# Patient Record
Sex: FEMALE | Race: OTHER | HISPANIC OR LATINO
[De-identification: names, ages, dates, MRNs, and addresses within clinical notes are randomized per-mention and may not be internally consistent; named-entity substitution may affect disease eponyms.]

---

## 2019-02-19 ENCOUNTER — APPOINTMENT (OUTPATIENT)
Dept: ENDOCRINOLOGY | Facility: CLINIC | Age: 59
End: 2019-02-19
Payer: COMMERCIAL

## 2019-02-19 ENCOUNTER — TRANSCRIPTION ENCOUNTER (OUTPATIENT)
Age: 59
End: 2019-02-19

## 2019-02-19 VITALS
DIASTOLIC BLOOD PRESSURE: 78 MMHG | HEIGHT: 62 IN | BODY MASS INDEX: 25.4 KG/M2 | SYSTOLIC BLOOD PRESSURE: 110 MMHG | WEIGHT: 138 LBS | HEART RATE: 60 BPM

## 2019-02-19 DIAGNOSIS — E21.3 HYPERPARATHYROIDISM, UNSPECIFIED: ICD-10-CM

## 2019-02-19 DIAGNOSIS — E03.9 HYPOTHYROIDISM, UNSPECIFIED: ICD-10-CM

## 2019-02-19 DIAGNOSIS — E55.9 VITAMIN D DEFICIENCY, UNSPECIFIED: ICD-10-CM

## 2019-02-19 DIAGNOSIS — M81.0 AGE-RELATED OSTEOPOROSIS W/OUT CURRENT PATHOLOGICAL FRACTURE: ICD-10-CM

## 2019-02-19 PROBLEM — Z00.00 ENCOUNTER FOR PREVENTIVE HEALTH EXAMINATION: Status: ACTIVE | Noted: 2019-02-19

## 2019-02-19 PROCEDURE — 99203 OFFICE O/P NEW LOW 30 MIN: CPT

## 2019-04-18 PROBLEM — M81.0 OSTEOPOROSIS: Status: ACTIVE | Noted: 2019-02-19

## 2019-04-18 NOTE — HISTORY OF PRESENT ILLNESS
[FreeTextEntry1] : February 19, 2019\par \par PCP:  Dr. Smith at [facility] in [place]\par          Gyn:  Dr. Lynch\par          Ortho:  Dr. Ruth Lopez\par \par \par CC:  Recent fracture of L radial head\par         History of "osteopenia" \par \par 59 yo mother of three, LMP ~ag3 53\par has had bone density tests at [facility] in the past.\par Told of osteopenia and treated with alendronate for about 2 years ago, stopping 2 years ago\par No major fractures until recent fall on the ice:  fracture of L distal radius - wearing a cast today.  \par Carries Dx of scoliosis  \par \par Impression:  History of "osteopenia" significant enough to be treated with alendronate in the past.\par \par Plan:  Dx:  Updated bone density at [place]\par 24 hour urine for calcium and additional lab tests related to bone strength\par Call me one week after above studies.\par Decision then regarding and potential intervention.  \par ROV in September.

## 2020-04-26 ENCOUNTER — MESSAGE (OUTPATIENT)
Age: 60
End: 2020-04-26

## 2020-05-02 LAB
SARS-COV-2 IGG SERPL IA-ACNC: <0.1 INDEX
SARS-COV-2 IGG SERPL QL IA: NEGATIVE

## 2020-05-04 ENCOUNTER — APPOINTMENT (OUTPATIENT)
Age: 60
End: 2020-05-04